# Patient Record
Sex: MALE | Race: WHITE | NOT HISPANIC OR LATINO | URBAN - METROPOLITAN AREA
[De-identification: names, ages, dates, MRNs, and addresses within clinical notes are randomized per-mention and may not be internally consistent; named-entity substitution may affect disease eponyms.]

---

## 2017-11-09 ENCOUNTER — INPATIENT (INPATIENT)
Facility: HOSPITAL | Age: 47
LOS: 1 days | Discharge: HOME | DRG: 101 | End: 2017-11-11
Attending: PSYCHIATRY & NEUROLOGY | Admitting: PSYCHIATRY & NEUROLOGY
Payer: COMMERCIAL

## 2017-11-09 VITALS
DIASTOLIC BLOOD PRESSURE: 93 MMHG | RESPIRATION RATE: 17 BRPM | OXYGEN SATURATION: 90 % | HEART RATE: 116 BPM | TEMPERATURE: 98 F | SYSTOLIC BLOOD PRESSURE: 154 MMHG

## 2017-11-09 DIAGNOSIS — R56.9 UNSPECIFIED CONVULSIONS: ICD-10-CM

## 2017-11-09 DIAGNOSIS — R63.8 OTHER SYMPTOMS AND SIGNS CONCERNING FOOD AND FLUID INTAKE: ICD-10-CM

## 2017-11-09 DIAGNOSIS — Z98.890 OTHER SPECIFIED POSTPROCEDURAL STATES: Chronic | ICD-10-CM

## 2017-11-09 DIAGNOSIS — Z29.9 ENCOUNTER FOR PROPHYLACTIC MEASURES, UNSPECIFIED: ICD-10-CM

## 2017-11-09 DIAGNOSIS — E66.9 OBESITY, UNSPECIFIED: ICD-10-CM

## 2017-11-09 DIAGNOSIS — Z78.9 OTHER SPECIFIED HEALTH STATUS: ICD-10-CM

## 2017-11-09 LAB
ALBUMIN SERPL ELPH-MCNC: 3.8 G/DL — SIGNIFICANT CHANGE UP (ref 3.4–5)
ALP SERPL-CCNC: 92 U/L — SIGNIFICANT CHANGE UP (ref 40–120)
ALT FLD-CCNC: 31 U/L — SIGNIFICANT CHANGE UP (ref 12–42)
ANION GAP SERPL CALC-SCNC: 10 MMOL/L — SIGNIFICANT CHANGE UP (ref 9–16)
AST SERPL-CCNC: 21 U/L — SIGNIFICANT CHANGE UP (ref 15–37)
BILIRUB SERPL-MCNC: 0.3 MG/DL — SIGNIFICANT CHANGE UP (ref 0.2–1.2)
BUN SERPL-MCNC: 12 MG/DL — SIGNIFICANT CHANGE UP (ref 7–23)
CALCIUM SERPL-MCNC: 8.5 MG/DL — SIGNIFICANT CHANGE UP (ref 8.5–10.5)
CHLORIDE SERPL-SCNC: 107 MMOL/L — SIGNIFICANT CHANGE UP (ref 96–108)
CO2 SERPL-SCNC: 24 MMOL/L — SIGNIFICANT CHANGE UP (ref 22–31)
CREAT SERPL-MCNC: 1.02 MG/DL — SIGNIFICANT CHANGE UP (ref 0.5–1.3)
ETHANOL SERPL-MCNC: <3 MG/DL — SIGNIFICANT CHANGE UP
GLUCOSE SERPL-MCNC: 140 MG/DL — HIGH (ref 70–99)
HCT VFR BLD CALC: 40 % — SIGNIFICANT CHANGE UP (ref 39–50)
HGB BLD-MCNC: 14.2 G/DL — SIGNIFICANT CHANGE UP (ref 13–17)
INR BLD: 0.99 — SIGNIFICANT CHANGE UP (ref 0.88–1.16)
LACTATE SERPL-SCNC: SIGNIFICANT CHANGE UP MMOL/L (ref 0.4–2)
MAGNESIUM SERPL-MCNC: 2.4 MG/DL — SIGNIFICANT CHANGE UP (ref 1.6–2.6)
MCHC RBC-ENTMCNC: 32.6 PG — SIGNIFICANT CHANGE UP (ref 27–34)
MCHC RBC-ENTMCNC: 35.5 G/DL — SIGNIFICANT CHANGE UP (ref 32–36)
MCV RBC AUTO: 92 FL — SIGNIFICANT CHANGE UP (ref 80–100)
PCP SPEC-MCNC: SIGNIFICANT CHANGE UP
PLATELET # BLD AUTO: 213 K/UL — SIGNIFICANT CHANGE UP (ref 150–400)
POTASSIUM SERPL-MCNC: 4.1 MMOL/L — SIGNIFICANT CHANGE UP (ref 3.5–5.3)
POTASSIUM SERPL-SCNC: 4.1 MMOL/L — SIGNIFICANT CHANGE UP (ref 3.5–5.3)
PROT SERPL-MCNC: 7 G/DL — SIGNIFICANT CHANGE UP (ref 6.4–8.2)
PROTHROM AB SERPL-ACNC: 10.9 SEC — SIGNIFICANT CHANGE UP (ref 9.8–12.7)
RBC # BLD: 4.35 M/UL — SIGNIFICANT CHANGE UP (ref 4.2–5.8)
RBC # FLD: 12.3 % — SIGNIFICANT CHANGE UP (ref 10.3–16.9)
SODIUM SERPL-SCNC: 141 MMOL/L — SIGNIFICANT CHANGE UP (ref 132–145)
WBC # BLD: 6 K/UL — SIGNIFICANT CHANGE UP (ref 3.8–10.5)
WBC # FLD AUTO: 6 K/UL — SIGNIFICANT CHANGE UP (ref 3.8–10.5)

## 2017-11-09 PROCEDURE — 70450 CT HEAD/BRAIN W/O DYE: CPT | Mod: 26

## 2017-11-09 PROCEDURE — 99285 EMERGENCY DEPT VISIT HI MDM: CPT | Mod: 25

## 2017-11-09 PROCEDURE — 93010 ELECTROCARDIOGRAM REPORT: CPT | Mod: 59

## 2017-11-09 PROCEDURE — 95951: CPT | Mod: 26

## 2017-11-09 RX ORDER — SODIUM CHLORIDE 9 MG/ML
3 INJECTION INTRAMUSCULAR; INTRAVENOUS; SUBCUTANEOUS EVERY 8 HOURS
Refills: 0 | Status: DISCONTINUED | OUTPATIENT
Start: 2017-11-09 | End: 2017-11-09

## 2017-11-09 RX ORDER — ASPIRIN/CALCIUM CARB/MAGNESIUM 324 MG
81 TABLET ORAL DAILY
Refills: 0 | Status: DISCONTINUED | OUTPATIENT
Start: 2017-11-09 | End: 2017-11-11

## 2017-11-09 RX ORDER — CHOLECALCIFEROL (VITAMIN D3) 125 MCG
400 CAPSULE ORAL DAILY
Refills: 0 | Status: DISCONTINUED | OUTPATIENT
Start: 2017-11-09 | End: 2017-11-11

## 2017-11-09 RX ORDER — PREGABALIN 225 MG/1
1000 CAPSULE ORAL DAILY
Refills: 0 | Status: DISCONTINUED | OUTPATIENT
Start: 2017-11-09 | End: 2017-11-11

## 2017-11-09 RX ORDER — SODIUM CHLORIDE 9 MG/ML
1000 INJECTION INTRAMUSCULAR; INTRAVENOUS; SUBCUTANEOUS ONCE
Refills: 0 | Status: COMPLETED | OUTPATIENT
Start: 2017-11-09 | End: 2017-11-09

## 2017-11-09 RX ADMIN — SODIUM CHLORIDE 3 MILLILITER(S): 9 INJECTION INTRAMUSCULAR; INTRAVENOUS; SUBCUTANEOUS at 05:59

## 2017-11-09 RX ADMIN — Medication 400 UNIT(S): at 14:17

## 2017-11-09 RX ADMIN — Medication 81 MILLIGRAM(S): at 14:16

## 2017-11-09 RX ADMIN — Medication 1 TABLET(S): at 14:16

## 2017-11-09 RX ADMIN — PREGABALIN 1000 MICROGRAM(S): 225 CAPSULE ORAL at 16:20

## 2017-11-09 RX ADMIN — SODIUM CHLORIDE 3 MILLILITER(S): 9 INJECTION INTRAMUSCULAR; INTRAVENOUS; SUBCUTANEOUS at 14:17

## 2017-11-09 RX ADMIN — SODIUM CHLORIDE 1000 MILLILITER(S): 9 INJECTION INTRAMUSCULAR; INTRAVENOUS; SUBCUTANEOUS at 05:59

## 2017-11-09 NOTE — ED ADULT TRIAGE NOTE - CHIEF COMPLAINT QUOTE
BIBA s/p seizure in bed while asleep, ems called by wife. Patient post ictal, drowsy, able to answer simple questions. Given 10mg versed im in the field. 20G to left hand inserted by medics. tachycardic on arrival. no signs of injury. Reports has had seizure before one other time in 2008. Not on medication.

## 2017-11-09 NOTE — ED ADULT NURSE NOTE - NSHISCREENINGQ1_ED_A_ED
0933 pt arived at 0929, respirations agonal, pt unresponsive skin clammy aand cool, m noyola crna starts ambuing pt. Anesthesiologist called. 934 Dr lowery and Dr Joshi at bedside  0935 pt intubated by Dr Nahum glover given by Dr Joshi, ambued by PACU staff, Resp therapy notified need for ventilator. 0938 Dr Simona power, dr Juarez answered page, advised of status. 0944 resp here, placed pt on vent see resp notes     No

## 2017-11-09 NOTE — ED PROVIDER NOTE - OBJECTIVE STATEMENT
47 y/o M presents to ED s/p witnessed seizure like activity.  Per wife, she woke up to her  making a noise and noted him make a snoring like noise and moving his elbows out to the side repeatedly.  She states it seemed as though he could not breath.  When EMS arrived, pt was still altered and less responsive.  EMS states he had a moment of agitation before realizing.  He started to verbalize and conversant but confused.  Per wife, pt had seizure in 2014 with normal neuro work up at [facility].  He is not on seizure medication.  He denies illicit drug use or recent alcohol.  He denies headache, neck pain, visual changes, weakness or numbness.

## 2017-11-09 NOTE — ED PROVIDER NOTE - PMH
31F presents to the ED s/p trip and fall landed on left side. now with pain over left hip. No abd or chest pain. will obtain xray hip/femur pain control and reassess Seizure

## 2017-11-09 NOTE — H&P ADULT - ASSESSMENT
48M pmhx seizure in 2014 w/ negative workup at [facility] who experienced a similar episode this morning concerning for another seizure. 48M pmhx seizure in 2014 w/ negative workup at [facility] who experienced a similar episode this morning concerning for possible seizure.

## 2017-11-09 NOTE — H&P ADULT - ATTENDING COMMENTS
Discussed with patient that patients presenting with a second unprovoked seizure should be started on antiseizure drug therapy, since seizure recurrence indicates that the patient has a substantially increased risk for additional seizures.  Plan will be to monitor of AEDs for now to increase yield of EEG and will be start medication before discharge.  [facility] driving laws discussed- unable to drive until has been 1 year seizure free

## 2017-11-09 NOTE — H&P ADULT - NSHPPHYSICALEXAM_GEN_ALL_CORE
VITAL SIGNS:  T(C): 36.9 (11-09-17 @ 11:18), Max: 36.9 (11-09-17 @ 05:27)  T(F): 98.4 (11-09-17 @ 11:18), Max: 98.4 (11-09-17 @ 05:27)  HR: 88 (11-09-17 @ 11:18) (88 - 116)  BP: 144/90 (11-09-17 @ 11:18) (141/82 - 154/93)  BP(mean): --  RR: 18 (11-09-17 @ 11:18) (17 - 18)  SpO2: 96% (11-09-17 @ 11:18) (90% - 99%)  Wt(kg): --    PHYSICAL EXAM:    Constitutional: WDWN resting comfortably in bed; NAD  Head: NC/AT.  Eyes: PERRL, EOMI, anicteric sclera. Bilateral- minimal lateral conjunctival hemorrhage  ENT: no nasal discharge; uvula midline, no oropharyngeal erythema or exudates; MMM  Neck: supple; no JVD or thyromegaly  Respiratory: CTA B/L; no W/R/R, no retractions  Cardiac: +S1/S2; RRR; no M/R/G; PMI non-displaced  Gastrointestinal: soft, NT/ND; no rebound or guarding; +BSx4  Back: spine midline, no bony tenderness or step-offs; no CVAT B/L  Extremities: WWP, no clubbing or cyanosis; no peripheral edema  Musculoskeletal: NROM x4; no joint swelling, tenderness or erythema  Vascular: 2+ radial, femoral, DP/PT pulses B/L  Dermatologic: skin warm, dry and intact; no rashes, wounds, or scars  Lymphatic: no submandibular or cervical LAD  Neurologic: AAOx3; CNII-XII grossly intact; no focal deficits. Strength and sensation 5/5 throughout. FNF intact. HTS intact. No pronator drift. Able to spell world backwards. Recall 3/3 words.   Psychiatric: affect and characteristics of appearance, verbalizations, behaviors are appropriate

## 2017-11-09 NOTE — H&P ADULT - PROBLEM SELECTOR PLAN 1
- Prior episode in 2014, workup at [facility] was apparently negative. Seizure appears to be tonic-clonic starting on the right (but possibly bilateral) and generalizing. +LOC, +Loss of urinary continence, +Post-ictal state. Patient has alcohol history 2-3 drinks per day with occasional binge drinking (8-9 drinks) but has had periods of abstinence without withdrawal symptoms. Last drink 8:30pm. Alcohol level on arrival <3.   - CTH showed possible left sided christopher hole but patient reports no prior cranial procedures. No other focal findings.  - vEEG.  - Obtain outside records from [facility] about prior episode if possible.   - Consider MRI brain w/wo contrast. - Prior episode in 2014, workup at [facility] was apparently negative. Seizure appears to be tonic-clonic starting on the right (but possibly bilateral) and generalizing. +LOC, +Loss of urinary continence, +Post-ictal state. Patient has alcohol history 2-3 drinks per day with occasional binge drinking (8-9 drinks) but has had periods of abstinence without withdrawal symptoms. Last drink 8:30pm. Alcohol level on arrival <3.   - CTH showed possible left sided christopher hole but patient reports no prior cranial procedures. No other focal findings.  - vEEG.  - Obtain outside records from [facility] about prior episode if possible.  - Fax sent - records should be coming to [facility] fax machine   - f/u MRI brain w/wo contrast.

## 2017-11-09 NOTE — H&P ADULT - NSHPLABSRESULTS_GEN_ALL_CORE
.  LABS:                         14.2   6.0   )-----------( 213      ( 09 Nov 2017 06:05 )             40.0     11-09    141  |  107  |  12  ----------------------------<  140<H>  4.1   |  24  |  1.02    Ca    8.5      09 Nov 2017 06:05  Mg     2.4     11-09    TPro  7.0  /  Alb  3.8  /  TBili  0.3  /  DBili  x   /  AST  21  /  ALT  31  /  AlkPhos  92  11-09    PT/INR - ( 09 Nov 2017 06:05 )   PT: 10.9 sec;   INR: 0.99            Lactate, Blood: 3.0 shelley chin mmoL/L (11-09 @ 06:05)    Tox screen +benzodiazepines.    Alcohol level negative.      RADIOLOGY, EKG & ADDITIONAL TESTS: Reviewed.     CTH - Motion artifact. Possible left-frontal christopher hole. No other focal findings.

## 2017-11-09 NOTE — H&P ADULT - PROBLEM SELECTOR PLAN 3
- Dash/TLC diet  - No IVF  - Replete electrolytes as needed - 2-3 drinks per day with occasional binge drinking (8-9 drinks in one sitting)  - Consider counseling patient on alcohol use during admission.

## 2017-11-09 NOTE — H&P ADULT - NSHPSOCIALHISTORY_GEN_ALL_CORE
Occasional cigar smoking but not active smoker. Denies infrequent marijuana use, none recently. Drinks 2-3 alcoholic drinks per day with occasional episodes of binge drinking (8-9 drinks) for several years. Patient has had periods of abstinence without withdrawal symptoms. Last drink was at 8:30pm last night. He works as a  for [employer]. Father recently passed away and his birthday is tomorrow.

## 2017-11-09 NOTE — ED PROVIDER NOTE - MEDICAL DECISION MAKING DETAILS
47 y/o M presents to ED s/p seizure like activity. Pt arrived awake and oriented.  VSS. NAD.  Pt given Versed pta.  Labs, EKG. 49 y/o M presents to ED s/p seizure like activity. Pt arrived awake and oriented.  VSS. NAD.  Pt given Versed pta.  Labs, EKG.  CT head results suggest possible christopher hole however, pt denies past surgery to brain.  Pt discussed with Dr. Olvera, epilepsy attending on call and accepted.  Transfer center called.

## 2017-11-09 NOTE — H&P ADULT - HISTORY OF PRESENT ILLNESS
48M pmhx seizure in 2014 w/ negative workup at [facility] who experienced a similar episode this morning concerning for another seizure. Per wife, at approx 4:30AM she woke up to her  making a noise which sounded like crying. She states it seemed as though he could not breath and appeared to have "foam coming out of his mouth". Per report the patients right elbow was shaking which progressed to generalized clonic movements. Patient was non-responsive to verbal stimuli, had loss of urinary continence and when EMS arrived, the patient was still altered and less responsive although the seizure like activity had stopped. Per wife patient was combative when EMS attempted to obtain vitals and they needed to sedate the patients in order to safely take him to the emergency room. The patient had a similar episode in 2014 and was evaluated at [facility] with a negative workup. No other history of seizures. Patient does drink 2-3 alcoholic drinks per day with occasional episodes of binge drinking (8-9 drinks) for several years. Patient has had periods of abstinence without withdrawal symptoms. Last drink was at 8:30pm last night.    On arrival to [facility] VS T 98.4 ->88, /93, SpO2 90-99% RA. He was given 1L NS bolus and endorsed to [facility] for further care.    Reports R shoulder pain which was similar to the pain he experienced in 2014 after his last episode. Reports mild frontal headache without photophobia or neck stiffness since yesterday. Reports more difficulty reading text for the past 2-3 months and possibly blurry vision. Reports snoring with poor sleep and daytime sleepiness and possible night-time apneic episodes per wife at bedside. Denies CP/SOB/Nausea/Vomiting/GREENE/PND. ROS otherwise negative.

## 2017-11-09 NOTE — ED ADULT NURSE REASSESSMENT NOTE - NS ED NURSE REASSESS COMMENT FT1
Remains in RM 11 sleeping in stretcher but easily rousable to verbal and tactile stimuli. attached to continuous cardiac monitoring. supp o2 therapy provided via nasal cannula and well tolerated. Saturating at 98% with o2. Noted to have sleep apnea. Safety maintained.

## 2017-11-10 ENCOUNTER — TRANSCRIPTION ENCOUNTER (OUTPATIENT)
Age: 47
End: 2017-11-10

## 2017-11-10 LAB
ANION GAP SERPL CALC-SCNC: 13 MMOL/L — SIGNIFICANT CHANGE UP (ref 5–17)
BUN SERPL-MCNC: 9 MG/DL — SIGNIFICANT CHANGE UP (ref 7–23)
CALCIUM SERPL-MCNC: 9.1 MG/DL — SIGNIFICANT CHANGE UP (ref 8.4–10.5)
CHLORIDE SERPL-SCNC: 105 MMOL/L — SIGNIFICANT CHANGE UP (ref 96–108)
CO2 SERPL-SCNC: 24 MMOL/L — SIGNIFICANT CHANGE UP (ref 22–31)
CREAT SERPL-MCNC: 0.84 MG/DL — SIGNIFICANT CHANGE UP (ref 0.5–1.3)
GLUCOSE SERPL-MCNC: 120 MG/DL — HIGH (ref 70–99)
HCT VFR BLD CALC: 40.2 % — SIGNIFICANT CHANGE UP (ref 39–50)
HGB BLD-MCNC: 13.5 G/DL — SIGNIFICANT CHANGE UP (ref 13–17)
MAGNESIUM SERPL-MCNC: 2.4 MG/DL — SIGNIFICANT CHANGE UP (ref 1.6–2.6)
MCHC RBC-ENTMCNC: 31.8 PG — SIGNIFICANT CHANGE UP (ref 27–34)
MCHC RBC-ENTMCNC: 33.6 G/DL — SIGNIFICANT CHANGE UP (ref 32–36)
MCV RBC AUTO: 94.8 FL — SIGNIFICANT CHANGE UP (ref 80–100)
PLATELET # BLD AUTO: 190 K/UL — SIGNIFICANT CHANGE UP (ref 150–400)
POTASSIUM SERPL-MCNC: 4 MMOL/L — SIGNIFICANT CHANGE UP (ref 3.5–5.3)
POTASSIUM SERPL-SCNC: 4 MMOL/L — SIGNIFICANT CHANGE UP (ref 3.5–5.3)
RBC # BLD: 4.24 M/UL — SIGNIFICANT CHANGE UP (ref 4.2–5.8)
RBC # FLD: 12.5 % — SIGNIFICANT CHANGE UP (ref 10.3–16.9)
SODIUM SERPL-SCNC: 142 MMOL/L — SIGNIFICANT CHANGE UP (ref 135–145)
VIT D25+D1,25 OH+D1,25 PNL SERPL-MCNC: 89.4 PG/ML — HIGH (ref 19.9–79.3)
WBC # BLD: 7 K/UL — SIGNIFICANT CHANGE UP (ref 3.8–10.5)
WBC # FLD AUTO: 7 K/UL — SIGNIFICANT CHANGE UP (ref 3.8–10.5)

## 2017-11-10 PROCEDURE — 70553 MRI BRAIN STEM W/O & W/DYE: CPT | Mod: 26

## 2017-11-10 PROCEDURE — 95951: CPT | Mod: 26,52

## 2017-11-10 RX ORDER — LEVETIRACETAM 250 MG/1
1 TABLET, FILM COATED ORAL
Qty: 60 | Refills: 0
Start: 2017-11-10 | End: 2017-12-10

## 2017-11-10 RX ORDER — INFLUENZA VIRUS VACCINE 15; 15; 15; 15 UG/.5ML; UG/.5ML; UG/.5ML; UG/.5ML
0.5 SUSPENSION INTRAMUSCULAR ONCE
Refills: 0 | Status: COMPLETED | OUTPATIENT
Start: 2017-11-10 | End: 2017-11-11

## 2017-11-10 RX ORDER — LEVETIRACETAM 250 MG/1
500 TABLET, FILM COATED ORAL EVERY 12 HOURS
Refills: 0 | Status: DISCONTINUED | OUTPATIENT
Start: 2017-11-10 | End: 2017-11-11

## 2017-11-10 RX ORDER — IBUPROFEN 200 MG
600 TABLET ORAL ONCE
Refills: 0 | Status: COMPLETED | OUTPATIENT
Start: 2017-11-10 | End: 2017-11-10

## 2017-11-10 RX ADMIN — Medication 600 MILLIGRAM(S): at 15:05

## 2017-11-10 RX ADMIN — LEVETIRACETAM 500 MILLIGRAM(S): 250 TABLET, FILM COATED ORAL at 17:08

## 2017-11-10 RX ADMIN — Medication 81 MILLIGRAM(S): at 11:32

## 2017-11-10 RX ADMIN — Medication 400 UNIT(S): at 11:32

## 2017-11-10 RX ADMIN — PREGABALIN 1000 MICROGRAM(S): 225 CAPSULE ORAL at 11:32

## 2017-11-10 RX ADMIN — Medication 600 MILLIGRAM(S): at 15:58

## 2017-11-10 RX ADMIN — Medication 1 TABLET(S): at 11:32

## 2017-11-10 NOTE — PROGRESS NOTE ADULT - ASSESSMENT
48M pmhx seizure in 2014 w/ negative workup at [facility] who experienced a similar episode this morning concerning for possible seizure.

## 2017-11-10 NOTE — PROGRESS NOTE ADULT - PROBLEM SELECTOR PLAN 3
2-3 drinks per day with occasional binge drinking (8-9 drinks in one sitting)  - Consider counseling patient on alcohol use during admission.  - no current signs of alcohol withdrawal, continue to monitor

## 2017-11-10 NOTE — DISCHARGE NOTE ADULT - FINDINGS/TREATMENT
IMPRESSION:    1. Cystic lesion demonstrating T1 isointensity, T2 prolongation, no   susceptibility, no diffusion restriction and no abnormal enhancement   measuring 1.1 cm AP x 1.2 cm TR centered in left frontal horn just   cephalad and adjacent to left foramen of Monro. Differential includes   colloid cyst, neural glial cyst, ventricular diverticulum, or arachnoid   cyst with intrinsic signal.   2. Asymmetrically larger left lateral ventricle suggests possibility of   intermittent left foramen of Monro obstruction related to cystic mass.   Clinical correlation recommended.  3. Cavum septum pellucidum and cavum vergae.  4. Small left retrocerebellar arachnoid cyst.  5. Chronic sinus disease with nodular thickening of the turbinates. Sinus   CT may be helpful for further characterization on an outpatient basis.

## 2017-11-10 NOTE — DISCHARGE NOTE ADULT - PLAN OF CARE
You were diagnosed with a seizure. You had a video EEG which showed no signs of further seizure activity. You were started on Keppra which is a medication that will decrease your chances of having seizures in the future. Please continue to take this medication as prescribed and follow up with doctor Olvera in outpatient. You were diagnosed with obesity based on your BMI of 46.9. Please utilize diet and exercise to lower your weight and follow up with your PCP for further management. You were found to have symptoms suggestive of possible Obstructive Sleep Apnea. Please follow up with your PCP for referral to a sleep study center. To prevent recurrences of seizures You were diagnosed with a seizure. You had a video EEG which showed no signs of further seizure activity. You were started on Keppra which is a medication that will decrease your chances of having seizures in the future. Please continue to take this medication as prescribed and follow up with doctor Wade in outpatient. Additionally, your MRI showed For your chronic shoulder pain, please follow up with Orthopedic Surgery for evaluation. You were diagnosed with a seizure. You had a video EEG which showed no signs of further seizure activity. You were started on Keppra which is a medication that will decrease your chances of having seizures in the future. Please continue to take this medication as prescribed and follow up with doctor Wade in outpatient. 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 However, this was seen on your previous MRI done at [facility] in 2015. Please follow up with neurosurgery outpatient for further evaluation and monitoring.

## 2017-11-10 NOTE — DISCHARGE NOTE ADULT - HOSPITAL COURSE
48M pmhx seizure in 2014 w/ negative workup at [facility] who experienced a tonic clonic seizure at home. It lasted 1-2 minutes and then resolved. The patient was post-ictal and confused and required benzos for sedation. The patient had a similar episode in 2014 and was evaluated at [facility] with a negative workup. No other history of seizures. Patient does drink 2-3 alcoholic drinks per day with occasional episodes of binge drinking (8-9 drinks) for several years. On arrival to [facility] VS T 98.4 ->88, /93, SpO2 90-99% RA. He was given 1L NS bolus and endorsed to [facility] for further care. A CT of the head showed Lucent defect of the inner cortex of the left frontal calvarium with remodeling that extends to the outer cortex may represent a christopher hole. The patient was admitted to the [facility]. He was started on VEEG which showed no signs of seizure-like activity. He was additionally sent for an MRI of the head. The patient was then cleared for discharge and was instructed to follow up with Dr. Olvera for further clinical management 48M pmhx seizure in 2014 w/ negative workup at [facility] who experienced a tonic clonic seizure at home. It lasted 1-2 minutes and then resolved. The patient was post-ictal and confused and required benzos for sedation. The patient had a similar episode in 2014 and was evaluated at [facility] with a negative workup. No other history of seizures. Patient does drink 2-3 alcoholic drinks per day with occasional episodes of binge drinking (8-9 drinks) for several years. On arrival to [facility] VS T 98.4 ->88, /93, SpO2 90-99% RA. He was given 1L NS bolus and endorsed to [facility] for further care. A CT of the head showed Lucent defect of the inner cortex of the left frontal calvarium with remodeling that extends to the outer cortex may represent a christopher hole. The patient was admitted to the [facility]. He was started on VEEG which showed no signs of seizure-like activity. He was additionally sent for an MRI of the head which showed cystic structure centered in left frontal horn just cephalad and adjacent to left foramen of Monro. Differential includes colloid cyst, neural glial cyst, ventricular diverticulum, or arachnoid cyst with intrinsic signal. Also showed symmetrically larger left lateral ventricle suggests possibility of intermittent left foramen of Monro obstruction related to cystic mass. However, these findings were present on his previous MRI done in 2015 at [facility] so no acute intervention was thought colin The patient was then cleared for discharge and was instructed to follow up with Dr. Olvera for further clinical management 48M pmhx seizure in 2014 w/ negative workup at [facility] who experienced a tonic clonic seizure at home. It lasted 1-2 minutes and then resolved. The patient was post-ictal and confused and required benzos for sedation. The patient had a similar episode in 2014 and was evaluated at [facility] with a negative workup. No other history of seizures. Patient does drink 2-3 alcoholic drinks per day with occasional episodes of binge drinking (8-9 drinks) for several years. On arrival to [facility] VS T 98.4 ->88, /93, SpO2 90-99% RA. He was given 1L NS bolus and endorsed to [facility] for further care. A CT of the head showed Lucent defect of the inner cortex of the left frontal calvarium with remodeling that extends to the outer cortex may represent a christopher hole. The patient was admitted to the [facility]. He was started on VEEG which showed no signs of seizure-like activity. He was additionally sent for an MRI of the head which showed cystic structure centered in left frontal horn just cephalad and adjacent to left foramen of Monro. Differential includes colloid cyst, neural glial cyst, ventricular diverticulum, or arachnoid cyst with intrinsic signal. Also showed symmetrically larger left lateral ventricle suggests possibility of intermittent left foramen of Monro obstruction related to cystic mass. However, these findings were present on his previous MRI done in 2015 at [facility] so no acute intervention was necessary. Patient has been instructed to follow up with neurosurgery outpatient. He was instructed to follow up with orthopedic surgery for his shoulder pain.  The patient was then cleared for discharge and was instructed to follow up with Dr. Olvera for further clinical management 48M pmhx seizure in 2014 w/ negative workup at [facility] who experienced a tonic clonic seizure at home. It lasted 1-2 minutes and then resolved. The patient was post-ictal and confused and required benzos for sedation. The patient had a similar episode in 2014 and was evaluated at [facility] with a negative workup. No other history of seizures. Patient does drink 2-3 alcoholic drinks per day with occasional episodes of binge drinking (8-9 drinks) for several years. On arrival to [facility] VS T 98.4 ->88, /93, SpO2 90-99% RA. He was given 1L NS bolus and endorsed to [facility] for further care. A CT of the head showed Lucent defect of the inner cortex of the left frontal calvarium with remodeling that extends to the outer cortex may represent a christopher hole. The patient was admitted to the [facility]. He was started on VEEG which showed no signs of seizure-like activity. He was additionally sent for an MRI of the head which showed cystic structure centered in left frontal horn just cephalad and adjacent to left foramen of Monro. Differential includes colloid cyst, neural glial cyst, ventricular diverticulum, or arachnoid cyst with intrinsic signal. Also showed symmetrically larger left lateral ventricle suggests possibility of intermittent left foramen of Monro obstruction related to cystic mass. However, these findings were present on his previous MRI done in 2015 at [facility] and has remained stable by comparison to previous report. Patient has been instructed to follow up with neurosurgery outpatient. He was instructed to follow up with orthopedic surgery for his shoulder pain.  The patient was then cleared for discharge and was instructed to follow up with Dr. Olvera for further clinical management 48M pmhx seizure in 2014 w/ negative workup at [facility] who experienced a tonic clonic seizure at home. It lasted 1-2 minutes and then resolved. The patient was post-ictal and confused and required benzos for sedation. The patient had a similar episode in 2014 and was evaluated at [facility] with a negative workup. No other history of seizures. Patient does drink 2-3 alcoholic drinks per day with occasional episodes of binge drinking (8-9 drinks) for several years. On arrival to [facility] VS T 98.4 ->88, /93, SpO2 90-99% RA. He was given 1L NS bolus and endorsed to [facility] for further care. A CT of the head showed Lucent defect of the inner cortex of the left frontal calvarium with remodeling that extends to the outer cortex may represent a christopher hole. The patient was admitted to the [facility]. He was started on VEEG which showed no signs of seizure-like activity. He was additionally sent for an MRI of the head which showed cystic structure centered in left frontal horn just cephalad and adjacent to left foramen of Monro. Differential includes colloid cyst, neural glial cyst, ventricular diverticulum, or arachnoid cyst with intrinsic signal. Also showed symmetrically larger left lateral ventricle suggests possibility of intermittent left foramen of Monro obstruction related to cystic mass. However, these findings were present on his previous MRI done in 2015 at [facility] and has remained stable by comparison to previous report. Patient has been instructed to follow up with neurosurgery outpatient. He was instructed to follow up with orthopedic surgery for his shoulder pain.  The patient was then cleared for discharge and was instructed to follow up with Dr. Olvera for further clinical management. Discussed importance of reducing alcohol intake and possible role it can play in provoking seizures.

## 2017-11-10 NOTE — PROGRESS NOTE ADULT - PROBLEM SELECTOR PLAN 1
Prior episode in 2014, workup at [facility] was apparently negative. Seizure appears to be tonic-clonic starting on the right (but possibly bilateral) and generalizing. +LOC, +Loss of urinary continence, +Post-ictal state. Patient has alcohol history 2-3 drinks per day with occasional binge drinking (8-9 drinks) but has had periods of abstinence without withdrawal symptoms. Last drink 8:30pm. Alcohol level on arrival <3.   - CTH showed possible left sided christopher hole but patient reports no prior cranial procedures. No other focal findings.  - vEEG on overnight  - f/u records from [facility]   - f/u MRI brain w/wo contrast.

## 2017-11-10 NOTE — DISCHARGE NOTE ADULT - CARE PROVIDER_API CALL
Eleanor Olvera), Neurology  [address]  [address] [facility], [place] [facility]  Phone: [phone]  Fax: [phone]

## 2017-11-10 NOTE — DISCHARGE NOTE ADULT - CARE PLAN
Principal Discharge DX:	Seizure  Secondary Diagnosis:	Obesity Principal Discharge DX:	Seizure  Goal:	To prevent recurrences of seizures  Instructions for follow-up, activity and diet:	You were diagnosed with a seizure. You had a video EEG which showed no signs of further seizure activity. You were started on Keppra which is a medication that will decrease your chances of having seizures in the future. Please continue to take this medication as prescribed and follow up with doctor Olvera in outpatient.  Secondary Diagnosis:	Obesity  Instructions for follow-up, activity and diet:	You were diagnosed with obesity based on your BMI of 46.9. Please utilize diet and exercise to lower your weight and follow up with your PCP for further management.  Secondary Diagnosis:	KEO (obstructive sleep apnea)  Instructions for follow-up, activity and diet:	You were found to have symptoms suggestive of possible Obstructive Sleep Apnea. Please follow up with your PCP for referral to a sleep study center. Principal Discharge DX:	Seizure  Goal:	To prevent recurrences of seizures  Instructions for follow-up, activity and diet:	You were diagnosed with a seizure. You had a video EEG which showed no signs of further seizure activity. You were started on Keppra which is a medication that will decrease your chances of having seizures in the future. Please continue to take this medication as prescribed and follow up with doctor Olvera in outpatient. Additionally, your MRI showed  Secondary Diagnosis:	Obesity  Instructions for follow-up, activity and diet:	You were diagnosed with obesity based on your BMI of 46.9. Please utilize diet and exercise to lower your weight and follow up with your PCP for further management.  Secondary Diagnosis:	KEO (obstructive sleep apnea)  Instructions for follow-up, activity and diet:	You were found to have symptoms suggestive of possible Obstructive Sleep Apnea. Please follow up with your PCP for referral to a sleep study center.  Secondary Diagnosis:	Shoulder pain, unspecified chronicity, unspecified laterality  Instructions for follow-up, activity and diet:	For your chronic shoulder pain, please follow up with Orthopedic Surgery for evaluation. Principal Discharge DX:	Seizure  Goal:	To prevent recurrences of seizures  Instructions for follow-up, activity and diet:	You were diagnosed with a seizure. You had a video EEG which showed no signs of further seizure activity. You were started on Keppra which is a medication that will decrease your chances of having seizures in the future. Please continue to take this medication as prescribed and follow up with doctor Olvera in outpatient. 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 However, this was seen on your previous MRI done at [facility] in 2015. Please follow up with neurosurgery outpatient for further evaluation and monitoring.  Secondary Diagnosis:	Obesity  Instructions for follow-up, activity and diet:	You were diagnosed with obesity based on your BMI of 46.9. Please utilize diet and exercise to lower your weight and follow up with your PCP for further management.  Secondary Diagnosis:	KEO (obstructive sleep apnea)  Instructions for follow-up, activity and diet:	You were found to have symptoms suggestive of possible Obstructive Sleep Apnea. Please follow up with your PCP for referral to a sleep study center.  Secondary Diagnosis:	Shoulder pain, unspecified chronicity, unspecified laterality  Instructions for follow-up, activity and diet:	For your chronic shoulder pain, please follow up with Orthopedic Surgery for evaluation.

## 2017-11-10 NOTE — DISCHARGE NOTE ADULT - ADDITIONAL INSTRUCTIONS
Please follow up with Dr. Olvera for continued management and prevention of further seizures. Please follow up with Dr. Olvera for continued management and prevention of further seizures. Please see neurosurgery regarding your MRI findings which have been stable since your last MRI in 2015 ([facility]). Please see orthopedic surgery for your shoulder pain.

## 2017-11-10 NOTE — DISCHARGE NOTE ADULT - MEDICATION SUMMARY - MEDICATIONS TO TAKE
I will START or STAY ON the medications listed below when I get home from the hospital:    aspirin 81 mg oral tablet  -- 1 tab(s) by mouth once a day  -- Indication: For Prophylactic measure    levETIRAcetam 500 mg oral tablet  -- 1 tab(s) by mouth every 12 hours  -- Indication: For Seizure    Vitamin B-12 1000 mcg oral tablet  -- 1 tab(s) by mouth once a day  -- Indication: For Nutrition, metabolism, and development symptoms    Vitamin D3 400 intl units oral capsule  -- 1 cap(s) by mouth once a day  -- Indication: For Nutrition, metabolism, and development symptoms

## 2017-11-10 NOTE — DISCHARGE NOTE ADULT - PATIENT PORTAL LINK FT
“You can access the [facility] Patient Portal, offered by [facility], by registering with the following website: [email or web address]”

## 2017-11-10 NOTE — PROGRESS NOTE ADULT - SUBJECTIVE AND OBJECTIVE BOX
OVERNIGHT EVENTS: Patient had VEEG placed overnight. AKIN.     SUBJECTIVE: Patient reports difficulty sleeping in hospital bed. Denies any further seizure episodes. Complains of continued shoulder pain since the seizure yesterday. Otherwise denies fever, chills, lightheadedness, CP, SOB, N/V/D/C, abdominal pain.     Vital Signs Last 12 Hrs  T(F): 97.4 (11-10-17 @ 05:04), Max: 98.3 (11-09-17 @ 20:26)  HR: 74 (11-10-17 @ 05:04) (74 - 84)  BP: 142/85 (11-10-17 @ 05:04) (142/85 - 150/75)  BP(mean): --  RR: 16 (11-10-17 @ 05:04) (16 - 17)  SpO2: 95% (11-10-17 @ 05:04) (95% - 96%)  I&O's Summary    PHYSICAL EXAM:  Constitutional: NAD, morbidly obese male, Lying comfortably in bed.   Respiratory: Normal rate, rhythm, depth, effort. CTAB. No w/r/r.   Cardiovascular: RRR, normal S1 and S2, no m/r/g.   Gastrointestinal: +BS, soft NTND.   Extremities: wwp, no edema.   Vascular: Pulses equal and strong throughout.   Neurological: AAOx3, Cranial nerves grossly intact, strength and sensation intact throughout.   Skin: No gross skin abnormalities.     LABS:                        14.2   6.0   )-----------( 213      ( 09 Nov 2017 06:05 )             40.0     11-09    141  |  107  |  12  ----------------------------<  140<H>  4.1   |  24  |  1.02    Ca    8.5      09 Nov 2017 06:05  Mg     2.4     11-09    TPro  7.0  /  Alb  3.8  /  TBili  0.3  /  DBili  x   /  AST  21  /  ALT  31  /  AlkPhos  92  11-09  PT/INR - ( 09 Nov 2017 06:05 )   PT: 10.9 sec;   INR: 0.99        RADIOLOGY & ADDITIONAL TESTS:  < from: CT Head No Cont (11.09.17 @ 06:52) >  Impression: Limited evaluation due to motion artifact. Lucent defect of   the inner cortex of the left frontal calvarium with remodeling that   extends to the outer cortex may represent a christopher hole. However there is   soft tissue seencoursing within this region. If there is further   clinical concern, would recommend MRI with gadolinium for further   evaluation. No acute hemorrhage. Sinusitis.    < end of copied text >      MEDICATIONS  (STANDING):  aspirin enteric coated 81 milliGRAM(s) Oral daily  cholecalciferol 400 Unit(s) Oral daily  cyanocobalamin 1000 MICROGram(s) Oral daily  multivitamin 1 Tablet(s) Oral daily    MEDICATIONS  (PRN):

## 2017-11-11 VITALS
TEMPERATURE: 97 F | DIASTOLIC BLOOD PRESSURE: 83 MMHG | OXYGEN SATURATION: 99 % | RESPIRATION RATE: 16 BRPM | SYSTOLIC BLOOD PRESSURE: 146 MMHG | HEART RATE: 74 BPM

## 2017-11-11 LAB
FOLATE SERPL-MCNC: 13.8 NG/ML — SIGNIFICANT CHANGE UP (ref 4.8–24.2)
VIT B12 SERPL-MCNC: 599 PG/ML — SIGNIFICANT CHANGE UP (ref 243–894)

## 2017-11-11 PROCEDURE — 95813 EEG EXTND MNTR 61-119 MIN: CPT | Mod: 26

## 2017-11-11 RX ADMIN — INFLUENZA VIRUS VACCINE 0.5 MILLILITER(S): 15; 15; 15; 15 SUSPENSION INTRAMUSCULAR at 10:55

## 2017-11-11 RX ADMIN — Medication 400 UNIT(S): at 11:33

## 2017-11-11 RX ADMIN — PREGABALIN 1000 MICROGRAM(S): 225 CAPSULE ORAL at 11:33

## 2017-11-11 RX ADMIN — LEVETIRACETAM 500 MILLIGRAM(S): 250 TABLET, FILM COATED ORAL at 07:06

## 2017-11-11 RX ADMIN — Medication 1 TABLET(S): at 11:33

## 2017-11-11 RX ADMIN — Medication 81 MILLIGRAM(S): at 11:33

## 2017-11-14 DIAGNOSIS — M25.519 PAIN IN UNSPECIFIED SHOULDER: ICD-10-CM

## 2017-11-14 DIAGNOSIS — G47.33 OBSTRUCTIVE SLEEP APNEA (ADULT) (PEDIATRIC): ICD-10-CM

## 2017-11-14 DIAGNOSIS — E66.9 OBESITY, UNSPECIFIED: ICD-10-CM

## 2017-11-14 DIAGNOSIS — R56.9 UNSPECIFIED CONVULSIONS: ICD-10-CM

## 2020-05-12 NOTE — DISCHARGE NOTE ADULT - FUNCTIONAL STATUS DATE
10-Nov-2017 ,[email or web address] 11-Nov-2017

## 2020-12-13 ENCOUNTER — TRANSCRIPTION ENCOUNTER (OUTPATIENT)
Age: 50
End: 2020-12-13

## 2021-03-09 ENCOUNTER — APPOINTMENT (OUTPATIENT)
Dept: OTOLARYNGOLOGY | Facility: CLINIC | Age: 51
End: 2021-03-09
Payer: COMMERCIAL

## 2021-03-09 VITALS
BODY MASS INDEX: 41.75 KG/M2 | DIASTOLIC BLOOD PRESSURE: 107 MMHG | HEART RATE: 121 BPM | WEIGHT: 315 LBS | HEIGHT: 73 IN | OXYGEN SATURATION: 95 % | TEMPERATURE: 96 F | SYSTOLIC BLOOD PRESSURE: 159 MMHG

## 2021-03-09 DIAGNOSIS — Z78.9 OTHER SPECIFIED HEALTH STATUS: ICD-10-CM

## 2021-03-09 DIAGNOSIS — J30.89 OTHER ALLERGIC RHINITIS: ICD-10-CM

## 2021-03-09 DIAGNOSIS — J32.9 CHRONIC SINUSITIS, UNSPECIFIED: ICD-10-CM

## 2021-03-09 DIAGNOSIS — J33.9 NASAL POLYP, UNSPECIFIED: ICD-10-CM

## 2021-03-09 DIAGNOSIS — R06.83 SNORING: ICD-10-CM

## 2021-03-09 PROBLEM — Z00.00 ENCOUNTER FOR PREVENTIVE HEALTH EXAMINATION: Status: ACTIVE | Noted: 2021-03-09

## 2021-03-09 PROCEDURE — 99072 ADDL SUPL MATRL&STAF TM PHE: CPT

## 2021-03-09 PROCEDURE — 99204 OFFICE O/P NEW MOD 45 MIN: CPT | Mod: 25

## 2021-03-09 PROCEDURE — 31231 NASAL ENDOSCOPY DX: CPT

## 2021-03-09 RX ORDER — AMOXICILLIN AND CLAVULANATE POTASSIUM 875; 125 MG/1; MG/1
875-125 TABLET, COATED ORAL
Qty: 20 | Refills: 0 | Status: ACTIVE | COMMUNITY
Start: 2021-03-09 | End: 1900-01-01

## 2021-03-09 RX ORDER — PREDNISONE 20 MG/1
20 TABLET ORAL
Qty: 15 | Refills: 0 | Status: ACTIVE | COMMUNITY
Start: 2021-03-09 | End: 1900-01-01

## 2021-03-09 RX ORDER — FLUTICASONE PROPIONATE 93 UG/1
93 SPRAY, METERED NASAL
Qty: 1 | Refills: 2 | Status: ACTIVE | COMMUNITY
Start: 2021-03-09 | End: 1900-01-01

## 2021-03-09 NOTE — PROCEDURE
[FreeTextEntry6] : We discussed the elevated risk of liberation of viral particles from the nasopharynx if the patient were to be asymptomatically infected with COVID-19; after weighing the risks & benefits the decision was made to proceed. The procedure was performed while wearing appropriate PPE and a camera attached to a video system was used to maximize separation from the patient. The scope was handled appropriately. \par Indication: requirement for exam not possible via anterior rhinoscopy; recurrent sinus complaints\par After verbal consent and the administration of a small amount of an aerosolized phenylephrine/lidocaine mix, examination was performed with a flexible endoscope. Findings:\par Septum: without significant deviation or impingement on other anatomic structures, incomplete exam\par Mucosa: normal\par Polyposis: grade 4 L, grade 3 R\par Inferior turbinates: unremarkable\par Middle and superior turbinates: poorly vis\par Inferior meatus: unremarkable\par Middle meatus: purulence\par Superior meatus: unremarkable\par Speno-ethmoidal recess: unremarkable\par Nasopharynx: unremarkable\par Secretions: purulent bilat\par Other findings: none [de-identified] : Indication: requirement for exam not possible via anterior examination\par After verbal consent and the administration of an aerosolized phenylephrine/lidocaine mix, examination was performed with a flexible endoscope placed through the nose. Findings:\par Nasopharynx: unremarkable\par Soft palate, lateral and posterior pharyngeal walls: unremarkable\par Base of tongue & lingual tonsil: minimal retrodisplacement\par Vallecula: unremarkable\par Epiglottis: unremarkable\par Piriform sinuses and pharyngoesophageal junction: unremarkable\par Arytenoids and AE folds: mod-sev interarytenoid edema\par Ventricle and false vocal folds: unremarkable\par True vocal folds: Smooth free edge; surface without ectasias or edema; normal movement bilaterally with good apposition in phonation\par Visible subglottis: unremarkable\par Other findings: ELM

## 2021-03-09 NOTE — ASSESSMENT
[FreeTextEntry1] : Trial steroids/abx, then CT, then RTC. Likely KEO; HST & RTC. \par Discussed allergen mitigation and provided the patient with the corresponding educational handout; reviewed proper nasal steroid administration technique. Discussed likely surgical mgmt of his polyps; allergy cx to also consider Dupixent/AIT. \par \par

## 2021-03-09 NOTE — HISTORY OF PRESENT ILLNESS
[de-identified] : Severe L=R difficulty breathing through his nose that worsens when supine- this began about a year ago. Mouth breathing at night. Has tried flonase for many weeks w/o improvement. Only clear drg from his nose; no sinus infections or facial pain. \par Had skin testing many years ago w/ allergies to dust mite, pollen, trees and others.\par Trouble sleeping at night w/ loud snoring which has worsened lately. No known witnessed apneas, daytime sleepiness, morning headaches; borderline HTN not currently on meds. Has gained 30 lbs in the last year. \par \par Covid-19 screening questions: \par   Sick contacts, recent international travel, shortness of breath, new or productive cough, fevers/chills/night sweats: no\par   COVID-19 testing: none\par   Vaccination: x1

## 2021-03-09 NOTE — PHYSICAL EXAM
[FreeTextEntry1] : moderate obesity [de-identified] : thick [Nasal Endoscopy Performed] : nasal endoscopy was performed, see procedure section for findings [de-identified] : vis large L sided polyp [Laryngoscopy Performed] : laryngoscopy was performed, see procedure section for findings [de-identified] : [facility] [Normal] : no rashes

## 2021-06-24 ENCOUNTER — APPOINTMENT (OUTPATIENT)
Dept: PULMONOLOGY | Facility: CLINIC | Age: 51
End: 2021-06-24

## 2021-08-06 ENCOUNTER — TRANSCRIPTION ENCOUNTER (OUTPATIENT)
Age: 51
End: 2021-08-06

## 2023-06-10 NOTE — ED ADULT NURSE NOTE - NS ED PATIENT SAFETY CONCERN
Pt by EMS with complaints of bass catheter \"falling out\" overnight. Pt has no complaints of pain. States his bass is chronic and changed regularly. Rash noted to left inguinal area, penis tip is split chronically.   No

## 2024-04-29 RX ORDER — ASPIRIN/CALCIUM CARB/MAGNESIUM 324 MG
1 TABLET ORAL
Qty: 0 | Refills: 0 | DISCHARGE

## 2024-04-29 RX ORDER — PREGABALIN 225 MG/1
1 CAPSULE ORAL
Qty: 0 | Refills: 0 | DISCHARGE

## 2024-04-29 RX ORDER — CHOLECALCIFEROL (VITAMIN D3) 125 MCG
1 CAPSULE ORAL
Qty: 0 | Refills: 0 | DISCHARGE

## 2025-03-18 NOTE — ED PROVIDER NOTE - HEAD, MLM
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 disturbances; Hypothyroid - prefer hot weather, slow, tired, depressed, thin hair, croaky voice, heavy periods, constipation, dry skin  Diabetes: polydipsia, polyuria, polyphagia (constant hunger without weight gain is more typical for a type I diabetic than type II), symptoms of hypoglycemia such as dizziness, sweating, headache, hunger, tongue dysarticulation    Adrenal: difficult to treat hypertension, chronic low blood pressure, orthostatic symptoms, darkening of skin in non-sun exposed places  Reproductive (male): difficulty with erection or sexual arousal, depression, lack of stamina/energy    Hematologic/lymphatic: anemia, purpura, petechia, results from routine hemolytic diseases screening, prolonged or excessive bleeding after dental extraction / injury, use of anticoagulant and antiplatelet drugs (including aspirin), family history of hemophilia, history of a blood transfusion, refused for blood donation    Allergic/immunologic: \"Difficulty breathing\" or \"choking\" (anaphylaxis) as a result of exposure to anything (and state what; e.g. \"bee sting\"). Swelling or pain at groin(s), " "axilla(e) or neck (swollen lymph nodes/glands), allergic response (rash/itch) to materials, foods, animals (e.g. cats); reaction to bee sting, unusual sneezing (in response to what), runny nose or itchy/teary eyes; food, medication or environmental allergy test(s) results.    Physical Exam:  GENERAL: No acute distress, appears comfortable  VITALS:/72 (BP Location: Left arm, Patient Position: Sitting, Cuff size : 11 - Adult)  Pulse 87  Ht 195.6 cm (77\")  Wt 109.8 kg (242 lb)  SpO2 97%  BMI 28.70 kg/m²  HEENT: Extraocular movements intact  NECK: No JVD  RESPIRATORY: clear to auscultation bilaterally surgical wounds well healed  CARDIOVASULAR: regular; normal S1 and S2; audible systolic murmur loudest at the apex with some radiation to the axilla  GI: nondistended, nontender to palpation, normoactive bowel sounds, no HSM  MUSCULOSKELETAL: no weakness, no atrophy  EXTREMITIES: no cyanosis, clubbing, or edema  NEUROLOGIC: grossly normal without focal deficits  SKIN: no rash    I have reviewed the chart and evaluated available pertinent laboratory, X-Ray and cardiac studies. 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 CARLOS done to evaluate a central leak of his YELENA 10/2020 showed LVEF 30%. He has continued to experience exertional dyspnea (described as a \"gasp\" when he walks) despite medical therapy and prior valve repair. PVCs have been present since at least 2017. 48 Hr holter 12/16/2020 showed 25% PVC's with pairs and infrequent triplets, longest run 6 beats up to 197/min. He presented to [facility] on 3/1/2021 for PVC ablation. ECGs also show LBBB.    Echo May 2021:  Left ventricular chamber size is moderately dilated. Left ventricular systolic function is moderate to severely reduced. LVEF 35-40%.  History of mitral valve repair; significant echogenicity in the mitral valve annulus representing mitral annular calcification or possible annuloplasty ring. Mitral valve leaflets mildly thickened, unrestricted in motion.  There is very mild mitral stenosis suggested by planimetry.  Mild mitral regurgitation. The left atrium is mildly dilated.  Mildly dilated right ventricle. Right ventricle global systolic function is mildly reduced.  PCI just performed- QRS 128ms LBBB.  Was struggling in terms of NYHA class III dyspnea symptoms. Echo in [place], w/ 12-L ECG, planned to consider CRT-P vs CRT-D based on LVEF and ADO-II closure of YELENA incomplete ligation in the future.    Echo October 2021 post PVC ablation:  1. Normal left ventricular cavity size and wall thickness with low normal ejection fraction estimated at 50-55%  2. Normal right ventricular size and systolic function  3. The repaired mitral valve has an elevated mean gradient of 6 mmHg at 76 bpm and at least moderate mitral regurgitation  4. RVSP estimated at 30 mmHg  5. Moderate left atrial enlargement    2021: Developed endocarditis, with CVA. IVC filter for DVT which has now been removed, LVEF on recent echo remarkably back to 50% range. Since discharge, he followed up with Dr. Richardson (10/12/21). He followed up with " [facility] Cardiologist (11/05/21), who reported as of (09/15/21) he is back on anticoagulation with Eliquis. Previous notes indicate incomplete closure of left atrial appendage, warranting continuation of anticoagulation. Eliquis was held temporarily following embolic strokes felt to be secondary to his endocarditis, given concerns for risk of hemorrhagic conversion. Remarkable clinic scenario and improvement. Doing much better post PCI, endocarditis resolved (thought to be from the UTI) and DVT was in that setting acutely, and IVC filter now removed. No fever, breathing good, back at work, strength down but mostly recovered from the CVA.    Today Mr. Piedra appears to be doing well. He denies any palpitations, presyncope or syncope. He is only on metoprolol for rate control and is not on any antiarrhythmic drugs. His left atrial appendage appeared sealed on most recent CARLOS and he is not on anticoagulation. His ILR battery is likely depleted and we gave Mr. Piedra the option of leaving the device is or scheduling the removal locally. Regarding his mitral valve, he follows with Dr. Bhatt locally in [place], and we recommended getting an echocardiogram to ensure that the mitral valve function is not worsening.    1. Atrial fibrillation-persistent initially enrolled in the amaze trial underwent PVI and CTI ablation in 2019. Currently in sinus rhythm and not on any antiarrhythmic drugs, continue metoprolol.    2. Stroke prevention -chads 2 vascular score quite elevated now with his stroke history of heart failure hypertension. He was previously on anticoagulation systemically in his left atrial appendage was surgically ligated however has a 3 mm central leak. We have previously discussed plugging this with an Amplatz ductal occluder but turned out the YELENA sealed itself off, so LAAC is complete. Otherwise DVT was transient his IVC filter has been removed he is not obligated to anticoagulation.    3. CAD-status post PCI  follows up in [facility] with Dr. Hernandez/Miller.    4. Risk factor management- Body mass index goal <30, blood pressure goal <130/85 and aggressive evaluation and treatment for obstructive sleep apnea as additional modifiable risk factors for AFib progression are warranted.    5. Mitral valve disease-moderate on recent echo has had surgical repair. So far did great post repair with normalized LVEF and reduced CHF. We recommended that he follow-up with his local cardiologist Dr. Bhatt with serial echocardiograms to ensure that his mitral valve function appears normal    6.ILR implantation -battery likely depleted. We stated that he has the option of leaving it as is or considering removal of the ILR either locally or can schedule with us. He opted for keeping it as is for now    Thank you for your referral to [facility], and for allowing me to participate in the care of this very pleasant gentleman.    Respectfully,    Sherif Prieto MD, [facility], [facility]    Associate Professor of Medicine  Director of Left Atrial Appendage Closure Program  Cardiac Electrophysiology  [facility] [place] [address] [phone] (cell)  [phone] (pager)  [email or web address]    Cc:  COLTON XIE MD    Electronically signed by Sherif Prieto MD at 07/17/2024 10:32 AM CDT               Impression:  Robert Piedra is a 62 y.o. male who presents for follow-up of stroke.  Etiology behind his stroke which occurred in 2021 is likely cardioembolic from infective endocarditis with vegetation on the mitral valve.  Patient does also have a history of atrial fibrillation however his left atrial appendage has been ligated.  Does not require anticoagulation.  No further workup is currently indicated.  Patient should optimize secondary prevention for stroke through primary care.  He can follow with my clinic as needed.    Diagnoses and all orders for  this visit:    1. History of stroke without residual deficits (Primary)    2. Paroxysmal atrial fibrillation    3. Essential hypertension    4. Hyperlipidemia LDL goal <70        Plan:  Continue aspirin 81 mg tablet, 1 tablet daily  Continue atorvastatin 80 mg tablet, 1 tablet nightly  Continue Repatha through primary care  Continue co-Q10 through primary care  LDL goal less than 70.  Currently 103 as of 10/8/2024.  A1c goal less than 6.5.  Currently 5.6 as of 10/8/2024.  BP goal less than 130/80.  Currently 130/82 in clinic.  Recommend Mediterranean-style diet  Activities as tolerated  No activity restrictions  Return to the emergency room for any new stroke symptoms  Follow-up with PCP as scheduled  Follow-up in our clinic as needed    The patient and I have discussed the plan of care and he is in full agreement at this time.     Follow Up   Return if symptoms worsen or fail to improve.            Josefina Smith, LUDWIG  03/18/25  14:06 CDT   Head is atraumatic. Head shape is symmetrical.